# Patient Record
Sex: MALE | Race: WHITE | NOT HISPANIC OR LATINO | Employment: OTHER | URBAN - METROPOLITAN AREA
[De-identification: names, ages, dates, MRNs, and addresses within clinical notes are randomized per-mention and may not be internally consistent; named-entity substitution may affect disease eponyms.]

---

## 2021-05-31 ENCOUNTER — HOSPITAL ENCOUNTER (EMERGENCY)
Facility: HOSPITAL | Age: 46
Discharge: HOME | End: 2021-05-31
Attending: EMERGENCY MEDICINE | Admitting: EMERGENCY MEDICINE
Payer: COMMERCIAL

## 2021-05-31 VITALS
HEART RATE: 74 BPM | DIASTOLIC BLOOD PRESSURE: 88 MMHG | TEMPERATURE: 97.9 F | HEIGHT: 68 IN | RESPIRATION RATE: 17 BRPM | BODY MASS INDEX: 28.07 KG/M2 | WEIGHT: 185.19 LBS | OXYGEN SATURATION: 99 % | SYSTOLIC BLOOD PRESSURE: 139 MMHG

## 2021-05-31 DIAGNOSIS — T15.91XA FOREIGN BODY, EYE, RIGHT, INITIAL ENCOUNTER: ICD-10-CM

## 2021-05-31 DIAGNOSIS — H57.11 ACUTE RIGHT EYE PAIN: Primary | ICD-10-CM

## 2021-05-31 PROCEDURE — 99283 EMERGENCY DEPT VISIT LOW MDM: CPT

## 2021-05-31 PROCEDURE — 99284 EMERGENCY DEPT VISIT MOD MDM: CPT | Performed by: EMERGENCY MEDICINE

## 2021-05-31 RX ORDER — TETRACAINE HYDROCHLORIDE 5 MG/ML
1 SOLUTION OPHTHALMIC ONCE
Status: COMPLETED | OUTPATIENT
Start: 2021-05-31 | End: 2021-05-31

## 2021-05-31 RX ORDER — GENTAMICIN SULFATE 3 MG/ML
1 SOLUTION/ DROPS OPHTHALMIC ONCE
Status: COMPLETED | OUTPATIENT
Start: 2021-05-31 | End: 2021-05-31

## 2021-05-31 RX ADMIN — GENTAMICIN SULFATE 1 DROP: 3 SOLUTION/ DROPS OPHTHALMIC at 16:53

## 2021-05-31 RX ADMIN — FLUORESCEIN SODIUM 1 STRIP: 1 STRIP OPHTHALMIC at 16:45

## 2021-05-31 RX ADMIN — TETRACAINE HYDROCHLORIDE 1 DROP: 5 SOLUTION OPHTHALMIC at 16:45

## 2021-05-31 NOTE — ED PROVIDER NOTES
History  Chief Complaint   Patient presents with    Foreign Body in Eye     pt states ton saturday to have been working on car and thinks "i got metal in my eye" pt right eye appears red     41 yo male who presents to ED for evaluation of R eye FB sensation x 2 days which started while he was working under a car drilling and felt something go into his eye  No visual disturbance  No eye pain  No drainage  No fever  No HA  None       History reviewed  No pertinent past medical history  History reviewed  No pertinent surgical history  History reviewed  No pertinent family history  I have reviewed and agree with the history as documented  E-Cigarette/Vaping    E-Cigarette Use Never User      E-Cigarette/Vaping Substances     Social History     Tobacco Use    Smoking status: Never Smoker    Smokeless tobacco: Never Used   Substance Use Topics    Alcohol use: Never     Frequency: Never    Drug use: Never       Review of Systems   Eyes: Negative for photophobia, pain, discharge, redness, itching and visual disturbance  All other systems reviewed and are negative  Physical Exam  Physical Exam  Vitals signs and nursing note reviewed  Constitutional:       General: He is not in acute distress  Appearance: He is well-developed  He is not diaphoretic  HENT:      Head: Normocephalic and atraumatic  Eyes:      General: No scleral icterus  Right eye: No discharge  Left eye: No discharge  Extraocular Movements: Extraocular movements intact  Conjunctiva/sclera: Conjunctivae normal       Pupils: Pupils are equal, round, and reactive to light  Comments: No proptosis or chemosis  No drainage  R eye two FBs visualized at 10 and 12 o clock  Anterior chamber deep and quiet  Attempted to remove with moistened cotton swab, unable to be removed  Neck:      Musculoskeletal: Normal range of motion and neck supple  Vascular: No JVD     Pulmonary:      Breath sounds: No stridor  Musculoskeletal: Normal range of motion  General: No deformity  Skin:     General: Skin is warm and dry  Capillary Refill: Capillary refill takes less than 2 seconds  Coloration: Skin is not pale  Findings: No erythema or rash  Neurological:      Mental Status: He is alert and oriented to person, place, and time  Cranial Nerves: No cranial nerve deficit  Sensory: No sensory deficit  Motor: No abnormal muscle tone  Coordination: Coordination normal          Vital Signs  ED Triage Vitals [05/31/21 1641]   Temperature Pulse Respirations Blood Pressure SpO2   97 9 °F (36 6 °C) 74 17 139/88 99 %      Temp Source Heart Rate Source Patient Position - Orthostatic VS BP Location FiO2 (%)   Oral Monitor Sitting Right arm --      Pain Score       No Pain           Vitals:    05/31/21 1641   BP: 139/88   Pulse: 74   Patient Position - Orthostatic VS: Sitting         Visual Acuity      ED Medications  Medications   fluorescein sodium sterile ophthalmic strip 1 strip (1 strip Right Eye Given 5/31/21 1645)   tetracaine 0 5 % ophthalmic solution 1 drop (1 drop Right Eye Given 5/31/21 1645)   gentamicin (GARAMYCIN) 0 3 % ophthalmic solution 1 drop (1 drop Right Eye Given 5/31/21 1653)       Diagnostic Studies  Results Reviewed     None                 No orders to display              Procedures  Procedures         ED Course                             SBIRT 22yo+      Most Recent Value   SBIRT (24 yo +)   In order to provide better care to our patients, we are screening all of our patients for alcohol and drug use  Would it be okay to ask you these screening questions? Yes Filed at: 05/31/2021 1646   Initial Alcohol Screen: US AUDIT-C    1  How often do you have a drink containing alcohol?  0 Filed at: 05/31/2021 1646   2  How many drinks containing alcohol do you have on a typical day you are drinking? 0 Filed at: 05/31/2021 1646   3a  Male UNDER 65:  How often do you have five or more drinks on one occasion? 0 Filed at: 05/31/2021 1646   3b  FEMALE Any Age, or MALE 65+: How often do you have 4 or more drinks on one occassion? 0 Filed at: 05/31/2021 1646   Audit-C Score  0 Filed at: 05/31/2021 1646   MIKHAIL: How many times in the past year have you    Used an illegal drug or used a prescription medication for non-medical reasons? Never Filed at: 05/31/2021 1646                    MDM  Number of Diagnoses or Management Options  Acute right eye pain:   Foreign body, eye, right, initial encounter:   Diagnosis management comments: Two ocular foreign bodies, unable to be removed by me, referred to ophthalmology  Disposition  Final diagnoses:   Acute right eye pain   Foreign body, eye, right, initial encounter     Time reflects when diagnosis was documented in both MDM as applicable and the Disposition within this note     Time User Action Codes Description Comment    5/31/2021  4:50 PM Bandar Gastelum [H57 11] Acute right eye pain     5/31/2021  4:50 PM [address] Foreign body, eye, right, initial encounter       ED Disposition     ED Disposition Condition Date/Time Comment    Discharge Stable Mon May 31, 2021  4:50 PM Kristopher Odompyartema discharge to home/self care  Follow-up Information     Follow up With Specialties Details Why [address] Ophthalmology In 1 day  [address]  [phone]            There are no discharge medications for this patient  No discharge procedures on file      PDMP Review     None          ED Provider  Electronically Signed by           Christophe Crawley MD  05/31/21 0744

## 2021-05-31 NOTE — Clinical Note
Markeysha Larson was seen and treated in our emergency department on 5/31/2021  Diagnosis:     Kristopher    He may return on this date: 06/02/2021         If you have any questions or concerns, please don't hesitate to call        Michelle Dasilva MD    ______________________________           _______________          _______________  Hospital Representative                              Date                                Time